# Patient Record
Sex: FEMALE | Race: WHITE | NOT HISPANIC OR LATINO
[De-identification: names, ages, dates, MRNs, and addresses within clinical notes are randomized per-mention and may not be internally consistent; named-entity substitution may affect disease eponyms.]

---

## 2021-01-06 PROBLEM — Z00.00 ENCOUNTER FOR PREVENTIVE HEALTH EXAMINATION: Status: ACTIVE | Noted: 2021-01-06

## 2021-01-19 ENCOUNTER — APPOINTMENT (OUTPATIENT)
Dept: HEART AND VASCULAR | Facility: CLINIC | Age: 86
End: 2021-01-19
Payer: MEDICARE

## 2021-01-19 VITALS
BODY MASS INDEX: 20.22 KG/M2 | WEIGHT: 103 LBS | DIASTOLIC BLOOD PRESSURE: 60 MMHG | HEART RATE: 82 BPM | TEMPERATURE: 98.7 F | OXYGEN SATURATION: 98 % | SYSTOLIC BLOOD PRESSURE: 130 MMHG | HEIGHT: 60 IN

## 2021-01-19 PROCEDURE — 93280 PM DEVICE PROGR EVAL DUAL: CPT

## 2021-01-19 PROCEDURE — 99204 OFFICE O/P NEW MOD 45 MIN: CPT | Mod: 25

## 2021-08-02 ENCOUNTER — NON-APPOINTMENT (OUTPATIENT)
Age: 86
End: 2021-08-02

## 2021-08-02 ENCOUNTER — APPOINTMENT (OUTPATIENT)
Dept: HEART AND VASCULAR | Facility: CLINIC | Age: 86
End: 2021-08-02
Payer: MEDICARE

## 2021-08-02 PROCEDURE — 93296 REM INTERROG EVL PM/IDS: CPT

## 2021-08-02 PROCEDURE — 93294 REM INTERROG EVL PM/LDLS PM: CPT

## 2021-09-08 ENCOUNTER — FORM ENCOUNTER (OUTPATIENT)
Age: 86
End: 2021-09-08

## 2021-10-19 ENCOUNTER — NON-APPOINTMENT (OUTPATIENT)
Age: 86
End: 2021-10-19

## 2021-10-19 ENCOUNTER — APPOINTMENT (OUTPATIENT)
Dept: HEART AND VASCULAR | Facility: CLINIC | Age: 86
End: 2021-10-19
Payer: MEDICARE

## 2021-10-19 VITALS
DIASTOLIC BLOOD PRESSURE: 80 MMHG | SYSTOLIC BLOOD PRESSURE: 100 MMHG | OXYGEN SATURATION: 98 % | WEIGHT: 100 LBS | HEIGHT: 60 IN | BODY MASS INDEX: 19.63 KG/M2 | HEART RATE: 71 BPM

## 2021-10-19 DIAGNOSIS — Z85.3 PERSONAL HISTORY OF MALIGNANT NEOPLASM OF BREAST: ICD-10-CM

## 2021-10-19 DIAGNOSIS — Z86.79 PERSONAL HISTORY OF OTHER DISEASES OF THE CIRCULATORY SYSTEM: ICD-10-CM

## 2021-10-19 PROCEDURE — 93280 PM DEVICE PROGR EVAL DUAL: CPT

## 2021-10-19 PROCEDURE — 99214 OFFICE O/P EST MOD 30 MIN: CPT | Mod: 25

## 2021-10-19 RX ORDER — AMLODIPINE BESYLATE 2.5 MG/1
2.5 TABLET ORAL
Qty: 270 | Refills: 0 | Status: ACTIVE | COMMUNITY
Start: 2021-10-11

## 2021-10-19 RX ORDER — ATORVASTATIN CALCIUM 10 MG/1
10 TABLET, FILM COATED ORAL
Qty: 90 | Refills: 0 | Status: ACTIVE | COMMUNITY
Start: 2021-09-21

## 2021-10-19 RX ORDER — ISOSORBIDE MONONITRATE 30 MG/1
30 TABLET, EXTENDED RELEASE ORAL
Qty: 90 | Refills: 0 | Status: ACTIVE | COMMUNITY
Start: 2021-10-01

## 2021-10-19 RX ORDER — APIXABAN 2.5 MG/1
2.5 TABLET, FILM COATED ORAL
Qty: 180 | Refills: 0 | Status: ACTIVE | COMMUNITY
Start: 2021-09-21

## 2021-10-19 RX ORDER — CLOBETASOL PROPIONATE 0.5 MG/ML
0.05 SOLUTION TOPICAL
Qty: 50 | Refills: 0 | Status: ACTIVE | COMMUNITY
Start: 2021-04-27

## 2021-10-19 RX ORDER — LATANOPROST/PF 0.005 %
0.01 DROPS OPHTHALMIC (EYE)
Qty: 8 | Refills: 0 | Status: ACTIVE | COMMUNITY
Start: 2021-10-09

## 2021-10-19 RX ORDER — TIMOLOL MALEATE 5 MG/ML
0.5 SOLUTION OPHTHALMIC
Qty: 5 | Refills: 0 | Status: ACTIVE | COMMUNITY
Start: 2021-09-21

## 2021-10-19 NOTE — PHYSICAL EXAM
[General Appearance - Well Developed] : well developed [General Appearance - In No Acute Distress] : no acute distress [Heart Rate And Rhythm] : heart rate and rhythm were normal [Heart Sounds] : normal S1 and S2 [Murmurs] : no murmurs present [] : no respiratory distress [Auscultation Breath Sounds / Voice Sounds] : lungs were clear to auscultation bilaterally [Left Infraclavicular] : left infraclavicular area [Clean] : clean [Dry] : dry [Well-Healed] : well-healed [Abdomen Soft] : soft [Abdomen Tenderness] : non-tender [Erythema] : not erythematous [Warm] : not warm [Tender] : not tender

## 2021-10-19 NOTE — PROCEDURE
[No] : not [NSR] : normal sinus rhythm [See Device Printout] : See device printout [Pacemaker] : pacemaker

## 2021-10-19 NOTE — REASON FOR VISIT
[Follow-up Device Check] : is here today for a follow-up device check visit for [Lead Malfunction] : lead malfunction [Family Member] : family member

## 2021-10-19 NOTE — REVIEW OF SYSTEMS
[Fever] : no fever [Chills] : no chills [Dyspnea on exertion] : not dyspnea during exertion [Chest Discomfort] : no chest discomfort [Palpitations] : no palpitations [Syncope] : no syncope [Abdominal Pain] : no abdominal pain

## 2021-10-19 NOTE — HISTORY OF PRESENT ILLNESS
[de-identified] : 89 yo female with pacemaker implanted July 2000 for sinus node dysfunction after syncopal episode.  She has had a chronically fractured RV lead.  She has a history of hypertension, carotid atherosclerosis, diastolic CHF, history of atrial fibrillation, and breast CA.  She feels good and has no complaints today.\par She was started on Eliquis recently for TIA symptoms and history of atrial fibrillation.\par Echo 5/2017 showed normal LV function, diastolic dysfunction, mild AI.  Nuclear stress 10/2017 was probably normal, could not exclude small amount of ischemia in diagonal territory.\par Pacemaker evaluation again shows RV lead fracture, with high bipolar impedance and threshold.  Switching to unipolar pacing and sensing shows normal impedance, R wave sensing, and threshold.  She noted some chest wall stimulation at high unipolar outputs, which were not present with normal RV outputs.

## 2022-01-18 ENCOUNTER — APPOINTMENT (OUTPATIENT)
Dept: HEART AND VASCULAR | Facility: CLINIC | Age: 87
End: 2022-01-18
Payer: MEDICARE

## 2022-01-18 ENCOUNTER — NON-APPOINTMENT (OUTPATIENT)
Age: 87
End: 2022-01-18

## 2022-01-18 PROCEDURE — 93294 REM INTERROG EVL PM/LDLS PM: CPT

## 2022-01-18 PROCEDURE — 93296 REM INTERROG EVL PM/IDS: CPT

## 2022-01-26 ENCOUNTER — FORM ENCOUNTER (OUTPATIENT)
Age: 87
End: 2022-01-26

## 2022-04-05 ENCOUNTER — APPOINTMENT (OUTPATIENT)
Dept: HEART AND VASCULAR | Facility: CLINIC | Age: 87
End: 2022-04-05

## 2022-04-05 VITALS
TEMPERATURE: 97.8 F | HEART RATE: 86 BPM | HEIGHT: 60 IN | BODY MASS INDEX: 19.83 KG/M2 | SYSTOLIC BLOOD PRESSURE: 148 MMHG | WEIGHT: 101 LBS | DIASTOLIC BLOOD PRESSURE: 78 MMHG | OXYGEN SATURATION: 97 %

## 2022-04-05 PROCEDURE — 99213 OFFICE O/P EST LOW 20 MIN: CPT | Mod: 25

## 2022-04-05 PROCEDURE — 93280 PM DEVICE PROGR EVAL DUAL: CPT

## 2022-04-05 RX ORDER — KETOCONAZOLE 20.5 MG/ML
2 SHAMPOO, SUSPENSION TOPICAL
Qty: 120 | Refills: 0 | Status: DISCONTINUED | COMMUNITY
Start: 2021-04-27 | End: 2022-04-05

## 2022-07-05 ENCOUNTER — APPOINTMENT (OUTPATIENT)
Dept: HEART AND VASCULAR | Facility: CLINIC | Age: 87
End: 2022-07-05

## 2022-08-07 NOTE — HISTORY OF PRESENT ILLNESS
[None] : The patient complains of no symptoms [de-identified] : 89 yo female with pacemaker implanted July 2000 for sinus node dysfunction after syncopal episode.  She has had a chronically fractured RV lead.  She has a history of hypertension, carotid atherosclerosis, diastolic CHF, history of atrial fibrillation, and breast CA.  She feels good and has no complaints today.\par She was started on Eliquis recently for TIA symptoms and history of atrial fibrillation.\par Echo 5/2017 showed normal LV function, diastolic dysfunction, mild AI.  Nuclear stress 10/2017 was probably normal, could not exclude small amount of ischemia in diagonal territory.\par Pacemaker evaluation again shows RV lead fracture, with high bipolar impedance and threshold.  Switching to unipolar pacing and sensing showed normal impedance, R wave sensing, and threshold.  She previously noted some chest wall stimulation at high unipolar outputs, which were not present with normal RV outputs.  No complaints.

## 2022-08-08 ENCOUNTER — APPOINTMENT (OUTPATIENT)
Dept: HEART AND VASCULAR | Facility: CLINIC | Age: 87
End: 2022-08-08

## 2022-08-14 ENCOUNTER — FORM ENCOUNTER (OUTPATIENT)
Age: 87
End: 2022-08-14

## 2022-09-12 ENCOUNTER — APPOINTMENT (OUTPATIENT)
Dept: HEART AND VASCULAR | Facility: CLINIC | Age: 87
End: 2022-09-12

## 2022-10-18 ENCOUNTER — APPOINTMENT (OUTPATIENT)
Dept: HEART AND VASCULAR | Facility: CLINIC | Age: 87
End: 2022-10-18

## 2022-10-19 ENCOUNTER — FORM ENCOUNTER (OUTPATIENT)
Age: 87
End: 2022-10-19

## 2022-11-28 ENCOUNTER — APPOINTMENT (OUTPATIENT)
Dept: HEART AND VASCULAR | Facility: CLINIC | Age: 87
End: 2022-11-28

## 2022-11-28 ENCOUNTER — NON-APPOINTMENT (OUTPATIENT)
Age: 87
End: 2022-11-28

## 2022-11-28 ENCOUNTER — FORM ENCOUNTER (OUTPATIENT)
Age: 87
End: 2022-11-28

## 2022-11-28 PROCEDURE — 93296 REM INTERROG EVL PM/IDS: CPT

## 2022-11-28 PROCEDURE — 93294 REM INTERROG EVL PM/LDLS PM: CPT

## 2022-12-26 ENCOUNTER — TRANSCRIPTION ENCOUNTER (OUTPATIENT)
Age: 87
End: 2022-12-26

## 2023-02-27 ENCOUNTER — APPOINTMENT (OUTPATIENT)
Dept: HEART AND VASCULAR | Facility: CLINIC | Age: 88
End: 2023-02-27
Payer: MEDICARE

## 2023-02-27 ENCOUNTER — NON-APPOINTMENT (OUTPATIENT)
Age: 88
End: 2023-02-27

## 2023-02-27 PROCEDURE — 93294 REM INTERROG EVL PM/LDLS PM: CPT

## 2023-02-27 PROCEDURE — 93296 REM INTERROG EVL PM/IDS: CPT

## 2023-03-01 ENCOUNTER — FORM ENCOUNTER (OUTPATIENT)
Age: 88
End: 2023-03-01

## 2023-04-04 ENCOUNTER — APPOINTMENT (OUTPATIENT)
Dept: HEART AND VASCULAR | Facility: CLINIC | Age: 88
End: 2023-04-04
Payer: MEDICARE

## 2023-04-04 VITALS
WEIGHT: 100 LBS | BODY MASS INDEX: 19.63 KG/M2 | SYSTOLIC BLOOD PRESSURE: 135 MMHG | OXYGEN SATURATION: 98 % | TEMPERATURE: 98.7 F | DIASTOLIC BLOOD PRESSURE: 70 MMHG | HEART RATE: 86 BPM | HEIGHT: 60 IN

## 2023-04-04 DIAGNOSIS — I48.91 UNSPECIFIED ATRIAL FIBRILLATION: ICD-10-CM

## 2023-04-04 DIAGNOSIS — T82.110D BREAKDOWN (MECHANICAL) OF CARDIAC ELECTRODE, SUBSEQUENT ENCOUNTER: ICD-10-CM

## 2023-04-04 DIAGNOSIS — I49.5 SICK SINUS SYNDROME: ICD-10-CM

## 2023-04-04 PROCEDURE — 93280 PM DEVICE PROGR EVAL DUAL: CPT

## 2023-04-04 PROCEDURE — 99214 OFFICE O/P EST MOD 30 MIN: CPT | Mod: 25

## 2023-04-04 RX ORDER — MECLIZINE HYDROCHLORIDE 12.5 MG/1
12.5 TABLET ORAL
Refills: 0 | Status: ACTIVE | COMMUNITY

## 2023-05-22 PROBLEM — I48.91 ATRIAL FIBRILLATION: Status: ACTIVE | Noted: 2021-10-19

## 2023-05-22 PROBLEM — T82.110D: Status: ACTIVE | Noted: 2021-10-19

## 2023-05-22 PROBLEM — I49.5 SINUS NODE DYSFUNCTION: Status: ACTIVE | Noted: 2021-10-19

## 2023-05-22 NOTE — HISTORY OF PRESENT ILLNESS
[None] : The patient complains of no symptoms [de-identified] : 89 yo female with pacemaker implanted July 2000 for sinus node dysfunction after syncopal episode.  She has had a chronically fractured RV lead.  She has a history of hypertension, carotid atherosclerosis, diastolic CHF, history of atrial fibrillation, and breast CA.  She feels good and has no complaints today.\par She was started on Eliquis for TIA symptoms and history of atrial fibrillation.\par Echo 5/2017 showed normal LV function, diastolic dysfunction, mild AI.  Nuclear stress 10/2017 was probably normal, could not exclude small amount of ischemia in diagonal territory.\par RV configuration was changed to unipolar previously, with improvement in parameters.  She previously noted some chest wall stimulation at high unipolar outputs, which were not present with normal RV outputs.  No complaints.

## 2023-07-05 ENCOUNTER — APPOINTMENT (OUTPATIENT)
Dept: HEART AND VASCULAR | Facility: CLINIC | Age: 88
End: 2023-07-05

## 2023-07-09 ENCOUNTER — FORM ENCOUNTER (OUTPATIENT)
Age: 88
End: 2023-07-09

## 2023-07-20 ENCOUNTER — NON-APPOINTMENT (OUTPATIENT)
Age: 88
End: 2023-07-20

## 2023-07-21 ENCOUNTER — APPOINTMENT (OUTPATIENT)
Dept: HEART AND VASCULAR | Facility: CLINIC | Age: 88
End: 2023-07-21
Payer: MEDICARE

## 2023-07-21 PROCEDURE — 93296 REM INTERROG EVL PM/IDS: CPT

## 2023-07-21 PROCEDURE — 93294 REM INTERROG EVL PM/LDLS PM: CPT

## 2023-10-20 ENCOUNTER — APPOINTMENT (OUTPATIENT)
Dept: HEART AND VASCULAR | Facility: CLINIC | Age: 88
End: 2023-10-20

## 2023-12-16 ENCOUNTER — TRANSCRIPTION ENCOUNTER (OUTPATIENT)
Age: 88
End: 2023-12-16

## 2023-12-18 ENCOUNTER — TRANSCRIPTION ENCOUNTER (OUTPATIENT)
Age: 88
End: 2023-12-18